# Patient Record
Sex: FEMALE | Race: BLACK OR AFRICAN AMERICAN | NOT HISPANIC OR LATINO | URBAN - METROPOLITAN AREA
[De-identification: names, ages, dates, MRNs, and addresses within clinical notes are randomized per-mention and may not be internally consistent; named-entity substitution may affect disease eponyms.]

---

## 2023-10-04 ENCOUNTER — OFFICE VISIT (OUTPATIENT)
Dept: URGENT CARE | Facility: URGENT CARE | Age: 63
End: 2023-10-04
Payer: COMMERCIAL

## 2023-10-04 VITALS
OXYGEN SATURATION: 100 % | DIASTOLIC BLOOD PRESSURE: 97 MMHG | TEMPERATURE: 98.7 F | RESPIRATION RATE: 18 BRPM | SYSTOLIC BLOOD PRESSURE: 167 MMHG | HEART RATE: 91 BPM | WEIGHT: 184 LBS

## 2023-10-04 DIAGNOSIS — M54.6 ACUTE RIGHT-SIDED THORACIC BACK PAIN: Primary | ICD-10-CM

## 2023-10-04 PROBLEM — I10 PRIMARY HYPERTENSION: Status: ACTIVE | Noted: 2023-09-11

## 2023-10-04 PROBLEM — R79.89 SERUM CREATININE RAISED: Status: ACTIVE | Noted: 2023-09-11

## 2023-10-04 LAB
ALBUMIN UR-MCNC: ABNORMAL MG/DL
APPEARANCE UR: CLEAR
BACTERIA #/AREA URNS HPF: ABNORMAL /HPF
BILIRUB UR QL STRIP: NEGATIVE
COLOR UR AUTO: YELLOW
GLUCOSE UR STRIP-MCNC: NEGATIVE MG/DL
HGB UR QL STRIP: ABNORMAL
KETONES UR STRIP-MCNC: ABNORMAL MG/DL
LEUKOCYTE ESTERASE UR QL STRIP: ABNORMAL
NITRATE UR QL: NEGATIVE
PH UR STRIP: 7 [PH] (ref 5–7)
RBC #/AREA URNS AUTO: ABNORMAL /HPF
SP GR UR STRIP: 1.01 (ref 1–1.03)
SQUAMOUS #/AREA URNS AUTO: ABNORMAL /LPF
TRANS CELLS #/AREA URNS HPF: ABNORMAL /HPF
UROBILINOGEN UR STRIP-ACNC: 0.2 E.U./DL
WBC #/AREA URNS AUTO: ABNORMAL /HPF

## 2023-10-04 PROCEDURE — 99204 OFFICE O/P NEW MOD 45 MIN: CPT | Performed by: PHYSICIAN ASSISTANT

## 2023-10-04 PROCEDURE — 81001 URINALYSIS AUTO W/SCOPE: CPT | Performed by: PHYSICIAN ASSISTANT

## 2023-10-04 RX ORDER — CHLORHEXIDINE GLUCONATE ORAL RINSE 1.2 MG/ML
15 SOLUTION DENTAL
COMMUNITY
Start: 2023-08-03

## 2023-10-04 RX ORDER — ATORVASTATIN CALCIUM 20 MG/1
20 TABLET, FILM COATED ORAL DAILY
COMMUNITY
Start: 2023-09-11

## 2023-10-04 RX ORDER — IBUPROFEN 800 MG/1
800 TABLET, FILM COATED ORAL
COMMUNITY
Start: 2023-08-02

## 2023-10-04 RX ORDER — LISINOPRIL 10 MG/1
10 TABLET ORAL DAILY
COMMUNITY
Start: 2023-09-11

## 2023-10-04 NOTE — PATIENT INSTRUCTIONS
Please go to the Emergency Room.   Flank pain and RUQ abd pain with guarding and out of proportion to expectation. Exceeds capabilities provided at this  site    [facility]  [address] , [place] [address]

## 2023-10-04 NOTE — PROGRESS NOTES
Chief Complaint   Patient presents with    Flank Pain     Onset: 10/3/2023: Pt reports sudden severe Right-sided flank pain, centralized in the back of the pts ribs. Reports no fevers, no pain when voiding, no trauma, and no respiratory symptoms.        ASSESSMENT/PLAN:  Lucy was seen today for flank pain.    Diagnoses and all orders for this visit:    Acute right-sided thoracic back pain  -     UA with Microscopic reflex to Culture - lab collect; Future  -     UA with Microscopic reflex to Culture - lab collect    Differential diagnosis includes nephrolithiasis, gallbladder etiology, gastritis, pancreatitis among others  Exam shows tenderness to the right flank and upper quadrant out of proportion to expectation, guarding.  Not a candidate for ADS  Needs to be evaluated higher level of care.  Directed to go to [facility] as this is the closest.  She will drive home and then her granddaughter will drive her    Gopaladelaida Avelar PA-C      SUBJECTIVE:  Lucy is a 62 year old female who presents to urgent care with right-sided upper quadrant pain.  Her granddaughter interprets over the phone for her.  Her pain started suddenly early this morning.  She did have some initial nausea but this resolved.  The pain is quite significant.  Denies any fevers, other abdominal pain, vomiting, diarrhea.  No acute injury or trauma    ROS: Pertinent ROS neg other than the symptoms noted above in the HPI.     OBJECTIVE:  BP (!) 167/97 (BP Location: Left arm, Patient Position: Sitting, Cuff Size: Adult Regular)   Pulse 91   Temp 98.7  F (37.1  C) (Oral)   Resp 18   Wt 83.5 kg (184 lb)   SpO2 100%    GENERAL: healthy, alert and no distress  ABDOMEN: Tenderness in the right upper quadrant, guarding  MS: no gross musculoskeletal defects noted, no edema.  Tenderness over the right inferior lateral rib cage with tenderness out of proportion to expectation, no obvious step-offs or crepitus  SKIN: no suspicious lesions or  danette    DIAGNOSTICS    No results found for any visits on 10/04/23.     No current outpatient medications on file.     No current facility-administered medications for this visit.      There is no problem list on file for this patient.     No past medical history on file.  No past surgical history on file.  No family history on file.  Social History     Tobacco Use    Smoking status: Not on file    Smokeless tobacco: Not on file   Substance Use Topics    Alcohol use: Not on file              The plan of care was discussed with the patient. They understand and agree with the course of treatment prescribed. A printed summary was given including instructions and medications.  The use of Dragon/PixelSteam dictation services may have been used to construct the content in this note; any grammatical or spelling errors are non-intentional. Please contact the author of this note directly if you are in need of any clarification.